# Patient Record
Sex: MALE | Race: WHITE | URBAN - METROPOLITAN AREA
[De-identification: names, ages, dates, MRNs, and addresses within clinical notes are randomized per-mention and may not be internally consistent; named-entity substitution may affect disease eponyms.]

---

## 2024-10-03 ENCOUNTER — LAB OUTSIDE AN ENCOUNTER (OUTPATIENT)
Dept: URBAN - METROPOLITAN AREA CLINIC 54 | Facility: CLINIC | Age: 49
End: 2024-10-03

## 2024-10-03 ENCOUNTER — DASHBOARD ENCOUNTERS (OUTPATIENT)
Age: 49
End: 2024-10-03

## 2024-10-03 ENCOUNTER — OFFICE VISIT (OUTPATIENT)
Dept: URBAN - METROPOLITAN AREA CLINIC 54 | Facility: CLINIC | Age: 49
End: 2024-10-03
Payer: COMMERCIAL

## 2024-10-03 VITALS
TEMPERATURE: 97.2 F | DIASTOLIC BLOOD PRESSURE: 120 MMHG | WEIGHT: 190.4 LBS | SYSTOLIC BLOOD PRESSURE: 190 MMHG | HEART RATE: 78 BPM | BODY MASS INDEX: 28.85 KG/M2 | HEIGHT: 68 IN

## 2024-10-03 DIAGNOSIS — R10.31 RLQ ABDOMINAL PAIN: ICD-10-CM

## 2024-10-03 DIAGNOSIS — I10 UNCONTROLLED HYPERTENSION: ICD-10-CM

## 2024-10-03 PROBLEM — 38341003: Status: ACTIVE | Noted: 2024-10-03

## 2024-10-03 PROCEDURE — 99244 OFF/OP CNSLTJ NEW/EST MOD 40: CPT

## 2024-10-03 PROCEDURE — 99204 OFFICE O/P NEW MOD 45 MIN: CPT

## 2024-10-03 RX ORDER — LISINOPRIL 40 MG/1
1 TABLET TABLET ORAL ONCE A DAY
Status: ACTIVE | COMMUNITY

## 2024-10-03 RX ORDER — METOPROLOL SUCCINATE 100 MG/1
1 TABLET TABLET, FILM COATED, EXTENDED RELEASE ORAL ONCE A DAY
Status: ACTIVE | COMMUNITY

## 2024-10-03 RX ORDER — HYDRALAZINE HYDROCHLORIDE 25 MG/1
1 TABLET WITH FOOD TABLET, FILM COATED ORAL TWICE A DAY
Status: ACTIVE | COMMUNITY

## 2024-10-03 NOTE — HPI-TODAY'S VISIT:
10/3/24: Patient was referred by Dr. Juan Miguel Ibrahim for RLQ pain. A copy of this document will be sent to the provider.  Pt is a 50 yo male with a PMH of uncontrolled HTN who presents for constant RLQ pain for 5 months. Usually a dull, constant pain (rated 1-2/10) but can randomly become sharp (rated 4-5/10) then  slowly fades over time. Non-radiating. No aggrevating or alleviating factors; no relation to food, movement, position, defecation or time of day. Can wake him from sleep. Regular bowel habits with 1 BM every morning. No straining, no melena or hematochezia. No family history of colon cancer. No prior cscope. Recent CT showed moderate stool in the colon and normal appendix, but unusual pancreatic head. F/u MRI ordered and reviewd at consult with Dr. 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 No need for further monitoring.

## 2025-03-14 ENCOUNTER — TELEPHONE ENCOUNTER (OUTPATIENT)
Dept: URBAN - METROPOLITAN AREA CLINIC 54 | Facility: CLINIC | Age: 50
End: 2025-03-14

## 2025-05-16 ENCOUNTER — OFFICE VISIT (OUTPATIENT)
Dept: URBAN - METROPOLITAN AREA SURGERY CENTER 14 | Facility: SURGERY CENTER | Age: 50
End: 2025-05-16
Payer: COMMERCIAL

## 2025-05-16 DIAGNOSIS — I10 UNCONTROLLED HYPERTENSION: ICD-10-CM

## 2025-05-16 DIAGNOSIS — K64.8 OTHER HEMORRHOIDS: ICD-10-CM

## 2025-05-16 DIAGNOSIS — R10.84 ABDOMINAL CRAMPING, GENERALIZED: ICD-10-CM

## 2025-05-16 DIAGNOSIS — R10.31 RLQ ABDOMINAL PAIN: ICD-10-CM

## 2025-05-16 PROCEDURE — 0528F RCMND FLW-UP 10 YRS DOCD: CPT | Performed by: INTERNAL MEDICINE

## 2025-05-16 PROCEDURE — G0121 COLON CA SCRN NOT HI RSK IND: HCPCS | Performed by: INTERNAL MEDICINE

## 2025-05-16 PROCEDURE — 00811 ANES LWR INTST NDSC NOS: CPT | Performed by: NURSE ANESTHETIST, CERTIFIED REGISTERED

## 2025-05-16 RX ORDER — METOPROLOL SUCCINATE 100 MG/1
1 TABLET TABLET, FILM COATED, EXTENDED RELEASE ORAL ONCE A DAY
Status: ACTIVE | COMMUNITY

## 2025-05-16 RX ORDER — HYDRALAZINE HYDROCHLORIDE 25 MG/1
1 TABLET WITH FOOD TABLET, FILM COATED ORAL TWICE A DAY
Status: ACTIVE | COMMUNITY

## 2025-05-16 RX ORDER — LISINOPRIL 40 MG/1
1 TABLET TABLET ORAL ONCE A DAY
Status: ACTIVE | COMMUNITY

## 2025-05-30 ENCOUNTER — OFFICE VISIT (OUTPATIENT)
Dept: URBAN - METROPOLITAN AREA CLINIC 54 | Facility: CLINIC | Age: 50
End: 2025-05-30
Payer: COMMERCIAL

## 2025-05-30 DIAGNOSIS — R10.31 RLQ ABDOMINAL PAIN: ICD-10-CM

## 2025-05-30 PROCEDURE — 99214 OFFICE O/P EST MOD 30 MIN: CPT

## 2025-05-30 RX ORDER — HYDRALAZINE HYDROCHLORIDE 25 MG/1
1 TABLET WITH FOOD TABLET, FILM COATED ORAL TWICE A DAY
Status: ACTIVE | COMMUNITY

## 2025-05-30 RX ORDER — LISINOPRIL 40 MG/1
1 TABLET TABLET ORAL ONCE A DAY
Status: ACTIVE | COMMUNITY

## 2025-05-30 RX ORDER — METOPROLOL SUCCINATE 100 MG/1
1 TABLET TABLET, FILM COATED, EXTENDED RELEASE ORAL ONCE A DAY
Status: ACTIVE | COMMUNITY

## 2025-05-30 RX ORDER — DICYCLOMINE HYDROCHLORIDE 20 MG/1
1 TABLET AS NEEDED FOR ABDOMINAL PAIN TABLET ORAL THREE TIMES A DAY
Qty: 90 TABLET | Refills: 0 | OUTPATIENT
Start: 2025-05-30

## 2025-05-30 NOTE — PHYSICAL EXAM GASTROINTESTINAL
Abdomen , nondistended, soft, localized RLQ tenderness without rebound, new LLQ tenderness, no rebound or guarding, no masses palpable

## 2025-05-30 NOTE — HPI-TODAY'S VISIT:
10/3/24: Patient was referred by Dr. Juan Miguel Ibrahim for RLQ pain. A copy of this document will be sent to the provider.  Pt is a 50 yo male with a PMH of uncontrolled HTN who presents for constant RLQ pain for 5 months. Usually a dull, constant pain (rated 1-2/10) but can randomly become sharp (rated 4-5/10) then  slowly fades over time. Non-radiating. No aggrevating or alleviating factors; no relation to food, movement, position, defecation or time of day. Can wake him from sleep. Regular bowel habits with 1 BM every morning. No straining, no melena or hematochezia. No family history of colon cancer. No prior cscope. Recent CT showed moderate stool in the colon and normal appendix, but unusual pancreatic head. F/u MRI ordered and reviewd at consult with Dr. 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 No need for further monitoring.  5/30/25: Patient returns for colonoscopy follow up. RLQ pain is unchanged. No associated GI symptoms or notable triggers. Recalls that symptoms started about a month after having Covid infection with severe coughing.  Colonoscopy 5/16/25: - The entire examined colon is normal. - External and internal hemorrhoids. - The examination was otherwise normal on direct and retroflexion views. - No specimens collected.